# Patient Record
Sex: FEMALE | Race: WHITE | NOT HISPANIC OR LATINO | URBAN - METROPOLITAN AREA
[De-identification: names, ages, dates, MRNs, and addresses within clinical notes are randomized per-mention and may not be internally consistent; named-entity substitution may affect disease eponyms.]

---

## 2020-01-20 ENCOUNTER — HOSPITAL ENCOUNTER (EMERGENCY)
Facility: HOSPITAL | Age: 35
Discharge: LEFT BEFORE DISCHARGE | End: 2020-01-20
Attending: SURGERY

## 2020-01-20 DIAGNOSIS — Z53.29 LEFT AGAINST MEDICAL ADVICE: Primary | ICD-10-CM

## 2020-01-20 DIAGNOSIS — F15.10 METHAMPHETAMINE ABUSE: ICD-10-CM

## 2020-01-20 PROCEDURE — 99281 EMR DPT VST MAYX REQ PHY/QHP: CPT

## 2020-01-20 NOTE — ED NOTES
PATIENT IN TRIAGE DECIDING AGAINST TREATMENT   DR ROBERSON IN TRIAGE SPEAKING WITH HER   UNABLE TO CONVINCE HER TO STAY   DENIES S/I OF H/I

## 2020-01-20 NOTE — ED PROVIDER NOTES
[facility]                                                 Chief Complaint  34 y.o. female with Addiction Problem (HEROIN)    History of Present Illness  Dior Arthur presents to the emergency room with polysubstance abuse  Patient reports heroin and meth and phentermine addiction for several years   Patient is requesting drug rehab, patient states she does not want psych eval  Patient states that she is not psychotic, she is not suicidal or homicidal today  Patient states that she wants to be placed in a drug rehab from the ER today  Patient is not interested in psychiatry consult, refuses Psychiatry MD nielson    The history is provided by the patient   device was not used during this ER visit  Medical history: Opiate dependence  Surgeries: Right index finger amputation and breast surgery  No Known Allergies     I have reviewed all of this patient's past medical, surgical, family, and social   histories as well as active allergies and medications documented in the  electronic medical record    Review of Systems and Physical Exam      Review of Systems  -- Constitution - no fever, denies fatigue, no weakness, no chills  -- Eyes - no tearing or redness, no visual disturbance  -- Ear, Nose - no tinnitus or earache, no nasal congestion or discharge  -- Mouth,Throat - no sore throat, no toothache, normal voice, normal swallowing  -- Respiratory - denies cough and congestion, no shortness of breath, no CEDILLO  -- Cardiovascular - denies chest pain, no palpitations, denies claudication  -- Gastrointestinal - denies abdominal pain, nausea, vomiting, or diarrhea  -- Genitourinary - no dysuria, denies flank pain, no hematuria, no STD risk  -- Musculoskeletal - denies back pain, negative for trauma or injury  -- Neurological - no headache, denies weakness or seizure; no LOC  -- Skin - denies pallor, rash, or changes in skin. no hives or welts noted  -- Psychiatric - polysubstance abuse  with no suicidal ideation    Physical Exam  -- Nursing note and vitals reviewed  -- Constitutional: Appears well-developed and well-nourished  -- Head: Atraumatic. Normocephalic. No obvious abnormality  -- Eyes: Pupils are equal and reactive to light. Normal conjunctiva and lids  -- Cardiac: Normal rate, regular rhythm and normal heart sounds  -- Pulmonary: Normal respiratory effort, breath sounds clear to auscultation  -- Abdominal: Soft, no tenderness. Normal bowel sounds. Normal liver edge  -- Musculoskeletal: Normal range of motion, no effusions. Joints stable   -- Neurological: No focal deficits. Showed good interaction with staff  -- Vascular: Posterior tibial, dorsalis pedis and radial pulses 2+ bilaterally      Emergency Room Course      ED Physician Management  -- Diagnosis management comments: 34 y.o. female with drug addiction issues  -- patient wants drug rehab, she is offered psychiatric evaluation today  -- patient is in no signs of withdrawal, alert appropriate today  -- patient denies any suicidal homicidal ideation, not psychotic on interview  -- patient does not want to stay for a [facility] psychiatric evaluation today  -- patient declines any help from the ER, will go outpatient drug rehab  -- this was against our advice, the patient walked out of the ER today    Diagnosis  -- The primary encounter diagnosis was Left against medical advice.   -- A diagnosis of Methamphetamine abuse was also pertinent to this visit.    Disposition and Plan  -- Disposition: home  -- Condition: stable  -- Patient is of sound mind and capable of making rational decisions  -- Patient is fully aware of the diagnosis and the consequences of leaving AMA  -- Pt was told inpatient treatment needed but wants to leave against advice  -- Patient signed the AMA papers; all questions answered. Voiced understanding    This note is dictated on M*Modal word recognition program.  There are word recognition mistakes that are occasionally  missed on review.         Stu Jasso MD  01/20/20 1600

## 2022-06-13 ENCOUNTER — PATIENT MESSAGE (OUTPATIENT)
Dept: OBSTETRICS AND GYNECOLOGY | Facility: CLINIC | Age: 37
End: 2022-06-13
Payer: MEDICAID

## 2024-10-10 ENCOUNTER — OFFICE VISIT (OUTPATIENT)
Dept: PSYCHIATRY | Facility: CLINIC | Age: 39
End: 2024-10-10
Payer: COMMERCIAL

## 2024-10-10 VITALS
HEIGHT: 63 IN | HEART RATE: 104 BPM | DIASTOLIC BLOOD PRESSURE: 70 MMHG | BODY MASS INDEX: 29.95 KG/M2 | SYSTOLIC BLOOD PRESSURE: 122 MMHG | WEIGHT: 169.06 LBS

## 2024-10-10 DIAGNOSIS — F41.1 GENERALIZED ANXIETY DISORDER: Primary | ICD-10-CM

## 2024-10-10 DIAGNOSIS — F33.0 MILD EPISODE OF RECURRENT MAJOR DEPRESSIVE DISORDER: ICD-10-CM

## 2024-10-10 PROCEDURE — 1159F MED LIST DOCD IN RCRD: CPT | Mod: CPTII,S$GLB,, | Performed by: FAMILY MEDICINE

## 2024-10-10 PROCEDURE — 1160F RVW MEDS BY RX/DR IN RCRD: CPT | Mod: CPTII,S$GLB,, | Performed by: FAMILY MEDICINE

## 2024-10-10 PROCEDURE — 3008F BODY MASS INDEX DOCD: CPT | Mod: CPTII,S$GLB,, | Performed by: FAMILY MEDICINE

## 2024-10-10 PROCEDURE — 3078F DIAST BP <80 MM HG: CPT | Mod: CPTII,S$GLB,, | Performed by: FAMILY MEDICINE

## 2024-10-10 PROCEDURE — 99999 PR PBB SHADOW E&M-EST. PATIENT-LVL II: CPT | Mod: PBBFAC,,, | Performed by: FAMILY MEDICINE

## 2024-10-10 PROCEDURE — 99205 OFFICE O/P NEW HI 60 MIN: CPT | Mod: S$GLB,,, | Performed by: FAMILY MEDICINE

## 2024-10-10 PROCEDURE — 3074F SYST BP LT 130 MM HG: CPT | Mod: CPTII,S$GLB,, | Performed by: FAMILY MEDICINE

## 2024-10-10 RX ORDER — SERTRALINE HYDROCHLORIDE 25 MG/1
25 TABLET, FILM COATED ORAL DAILY
Qty: 30 TABLET | Refills: 11 | Status: SHIPPED | OUTPATIENT
Start: 2024-10-10 | End: 2025-10-10

## 2024-10-10 NOTE — PROGRESS NOTES
Outpatient Psychiatry Initial Visit ([facility])    10/10/2024    Dior Arthur, a 39 y.o. female, presenting for initial evaluation visit. Met with patient.    Reason for Encounter: self-referral. Patient complains of:     History of Present Illness:   States she has a hx of addiction but has been sober from heroin for 4 years. States she has been fine. States she is stressed out all the time. States she has a daughter that is 20 months old and at one point she thought she had post partum depression. States she has anxiety and depression in waves. States she is dealing a lot with her daughters father as well trying to co parent. States she is glad she had a child now because if she still had her addition she would not make it. States was using heroin which caused her to be in and out of long-term and rehabs. States she lost her mom while she was in long-term and COVID started. States she spend 20 years in and out of long-term and rehab. States she was mentally done with using at that point. States she always wanted a normal life and she has that with her daughter. Patient states she works and comes home to her daughter and loves spending time with her. Denies SI, HI, AH, VH.     Mood: good   Anxiety: 3/10  Depression: 3/10    Support system: her family and friends.   Hobbies: play with her daughter.     Standardized Screenings tools:   PHQ9: Mild Depression- 5  NNAMDI- 7: Mild Anxiety -7        Stressors:  Daughter's father co-parenting     History:     Past Psychiatric History:   Previous therapy:  when she was younger   Previous psychiatric treatment and medication trials: yes - Lexapro Effexor states in the past never took anything constantly   Previous psychiatric hospitalizations: yes - 2020.   Previous diagnoses: no  Previous suicide attempts: no  History of violence: no  Currently in treatment with .  Suicidal Ideation: Denies   Auditory Hallucination:Denies   Visual Hallucination:Denies   Education: some college    Social  History:  Housing: House   Lives with: daughter   Marital status: single   Children: daughter 20 months   Education: some college   Special Ed: Denies   Legal:Denies   Employment: full time [employer]   Access to gun: denies   Hx of abuse: yes     Substance Abuse History:  Recreational drugs: denies currently. Sober from Heroin 4 years   Alcohol: Denies   Tobacco use: vape   Rehab: States went to a lot of rehabs. States last stay was 2019 for Heroin     Family Hx: sister bipolar     Neuro Hx  Seizure:Denies   Head trauma/TBI:Denies       Review Of Systems:     Medical Review Of Systems:  Pertinent positives noted in HPI    Psychiatric Review Of Systems:  Sleep: yes  Appetite changes: no  Weight changes: no  Energy: yes  Anhedonia no  Somatic symptoms: no  Anxiety/panic: yes states 2 weeks ago had her 1st panic attack   Guilty/hopeless: yes some guilt   Self-injurious behavior/risky behavior: no  Any drugs: no  Alcohol: no       Current Evaluation:       Mental Status Evaluation:  Appearance:  unremarkable, age appropriate, casually dressed, well dressed, neatly groomed   Behavior:  normal, cooperative   Speech:  no latency; no press   Mood:  steady   Affect:  congruent and appropriate   Thought Process:  normal and logical   Thought Content:  normal, no suicidality, no homicidality, delusions, or paranoia   Sensorium:  grossly intact, person, place, situation, time/date, day of week, month of year, year   Cognition:  grossly intact   Insight:  intact   Judgment:  behavior is adequate to circumstances, age appropriate     Physical/Somatic Complaints   The patient lists: no physical complaints.    Constitutional  unremarkable, age appropriate       Laboratory Data  No visits with results within 1 Month(s) from this visit.   Latest known visit with results is:   Historical on 02/22/2007   Component Date Value Ref Range Status    BUN 02/22/2007 14  5 - 23 mg/dl Final    Sodium 02/22/2007 142  136 -  145 mMol/l Final    Potassium 02/22/2007 4.1  3.3 - 5.3 mMol/l Final    Chloride 02/22/2007 106  95 - 110 mMol/l Final    CO2 02/22/2007 26  23.0 - 29.0 mEq/L Final    Glucose 02/22/2007 82  70 - 110 mg/dl Final    Creatinine 02/22/2007 0.7  0.5 - 1.4 mg/dl Final    Calcium 02/22/2007 9.2  8.7 - 10.5 mg/dl Final    Salicylate Lvl 02/22/2007 <0.5 (L)  3.0 - 29.0 mg/dl Final    Alcohol, Serum 02/22/2007 <10  <10.0 mg/dl Final    Acetaminophen (Tylenol), Serum 02/22/2007 <1.0 (LL)  10.0 - 25.0 ug/ml Final    TCA Screen 02/22/2007 <20.0  ng/mL Final    WBC 02/22/2007 5.47  4.8 - 10.8 K/uL Final    RBC 02/22/2007 4.54  4.00 - 5.40 M/uL Final    Hemoglobin 02/22/2007 11.4 (L)  12.0 - 16.0 gm/dl Final    Hematocrit 02/22/2007 35.3 (L)  37.0 - 48.5 % Final    MCV 02/22/2007 77.8 (L)  82 - 95 fL Final    MCH 02/22/2007 25.1 (L)  27 - 31 pg Final    MCHC 02/22/2007 32.3  32 - 36 % Final    RDW 02/22/2007 13.8  11.5 - 14.5 % Final    Gran % 02/22/2007 56.9  40 - 76 % Final    Lymph % 02/22/2007 34.6  25 - 40 % Final    Mono % 02/22/2007 6.8  0 - 10 % Final    Eosinophil % 02/22/2007 1.5  0.0 - 8.0 % Final    Basophil % 02/22/2007 0.2  0 - 2 % Final    Gran # (ANC) 02/22/2007 3.1  1.4 - 8.7 K/uL Final    Lymph # 02/22/2007 1.9  1.2 - 3.5 K/uL Final    Mono # 02/22/2007 0.4  0.1 - 0.8 K/uL Final    Eos # 02/22/2007 0.1  0.0 - 0.4 K/uL Final    Baso # 02/22/2007 0.0  0.0 - 0.1 K/uL Final    Platelets 02/22/2007 190  150 - 350 K/uL Final    MPV 02/22/2007 10.1  9.2 - 12.9 fL Final    TSH 02/22/2007 0.15 (L)  0.4 - 4.0 uIU/ml Final    Free T4 02/22/2007 1.00  0.71 - 1.51 ng/dl Final    Chlamydia, Amplified DNA 02/22/2007 Not Detected  Not detected Final    N gonorrhoeae, amplified DNA 02/22/2007 Not Detected  Not detected Final    Specimen Source 02/22/2007 CERVIX   Final    Trichomonas 02/22/2007 Moderate (A)  None Seen Final    Clue Cells 02/22/2007 Occ (A)  None Seen Final    Budding Yeast 02/22/2007 None Seen  None Seen Final     Fungal Hyphae 02/22/2007 None Seen  None Seen Final    WBC - Vaginal Screen 02/22/2007 Moderate (A)  <=OCC Final    Bacteria - Vaginal Screen 02/22/2007 Few (A)  None Seen Final    Benzodiazepines 02/22/2007 Positive (A)  Negative Final    Methadone metabolites 02/22/2007 Negative  Negative Final    Cocaine (Metab.) 02/22/2007 Negative  Negative Final    Opiate Scrn, Ur 02/22/2007 Positive (A)  Negative Final    Barbiturate Screen, Ur 02/22/2007 Negative  Negative Final    Amphetamine Screen, Ur 02/22/2007 Negative  Negative Final    THC 02/22/2007 Negative  Negative Final    Phencyclidine 02/22/2007 Negative  Negative Final    Propoxyphene 02/22/2007 Negative  Negative Final    Color, UA 02/22/2007 YELLOW  Yellow Final    Appearance, UA 02/22/2007 SLHAZY (A)  Clear Final    pH, UA 02/22/2007 6.0  4.5 - 8.0 Final    Specific Gravity, UA 02/22/2007 1.018  1.003 - 1.030 Final    Protein, UA 02/22/2007 NEG  Negative mg/dl Final    Glucose, UA 02/22/2007 NEG  Negative mg/dl Final    Ketones, UA 02/22/2007 NEG  Negative mg/dl Final    Bilirubin (UA) 02/22/2007 NEG  Negative Final    Occult Blood UA 02/22/2007 NEG  Negative Final    Nitrite, UA 02/22/2007 POS (A)  Negative Final    Urobilinogen, UA 02/22/2007 0.2  0 - 1 EU/DL Final    Leukocytes, UA 02/22/2007 NEG  Negative Final    Bacteria 02/22/2007 MANY (A)  None-Occ Final    Squam Epithel, UA 02/22/2007 <1  /hpf Final         Medications  Outpatient Encounter Medications as of 10/10/2024   Medication Sig Dispense Refill    alprazolam (XANAX) 0.5 MG tablet Take 1 tablet (0.5 mg total) by mouth 2 (two) times daily as needed for Anxiety. 30 tablet 1    escitalopram (LEXAPRO) 10 MG tablet Take 1 tablet (10 mg total) by mouth once daily. 30 tablet 2     No facility-administered encounter medications on file as of 10/10/2024.           Assessment - Diagnosis - Goals:     Impression: Dior Arthur, a 39 y.o. female, presenting for initial evaluation visit.Medication  management for depression and anxiety     Diagnosis: Generalized anxiety disorder, Mild episode of recurrent major depressive disorder.     Strengths and Liabilities: Strength: Patient accepts guidance/feedback, Strength: Patient is expressive/articulate., Strength: Patient is intelligent., Strength: Patient is motivated for change., Strength: Patient has positive support network.    Treatment Goals:    Anxiety: acquiring relapse prevention skills, reducing negative automatic thoughts, reducing physical symptoms of anxiety, and reducing time spent worrying (<30 minutes/day)  Depression: acquiring relapse prevention skills, increasing energy, increasing interest in usual activities, increasing motivation, and reducing excessive guilt    Treatment Plan/Recommendations:   Medication Management: Continue current medications. The risks and benefits of medication were discussed with the patient.    Start Zoloft 25 mg daily. Medication takes 3-4 weeks to start working. Take medication every day  Discussed diagnosis, risks and benefits of proposed treatment above vs alternative treatments vs no treatment, and potential side effects of these treatments, and the inherent unpredicatbility of individual response to treatment.The patient expresses understanding and gives informed consent to pursue treatment at this time believing that the potential benefits outweight the potential risks. Patient has no other questions. Risks/adverse effects discussed at this time including but not limited to: GI side effects, sexual dysfunction, activation vs sedation, triggering of suicidal thoughts, and serotonin syndrome.  I discussed with the patient the risks of Extrapyramidal Side Effects (dystonia, akathisia, parkinsonism), Metabolic syndrome (inlcuding Hyperglycemia, hyperlipidemia), Neuroleptic Malignant syndrome (fever, muscle rigidity, autonomic instability), Orthostatic hypotension, Tardive Dyskinesia with antipsychotic use.  Patient  voices understanding and agreement with this plan  Provided crisis numbers  Encouraged patient to keep future appointments.  Instructed patient to call or message with questions  In the event of an emergency, including suicidal ideation, patient was advised to go to the emergency room      Return to Clinic: 2 months    Total time: 50 minutes with more than 50% of time spent counseling and/or coordinating care.  (which included pts differential diagnosis and prognosis for psychiatric conditions, risks, benefits of treatments, instructions and adherence to treatment plan, risk reduction, reviewing current psychiatric medication regimen, medical problems and social stressors. In addtion to possible discussion with other healthcare provider/s)    Jasmyne Quiroz  PMHNP

## 2024-12-02 ENCOUNTER — OFFICE VISIT (OUTPATIENT)
Dept: PSYCHIATRY | Facility: CLINIC | Age: 39
End: 2024-12-02
Payer: COMMERCIAL

## 2024-12-02 DIAGNOSIS — F41.1 GENERALIZED ANXIETY DISORDER: Primary | ICD-10-CM

## 2024-12-02 DIAGNOSIS — F33.0 MILD EPISODE OF RECURRENT MAJOR DEPRESSIVE DISORDER: ICD-10-CM

## 2024-12-02 DIAGNOSIS — G47.00 INSOMNIA: ICD-10-CM

## 2024-12-02 PROCEDURE — 99999 PR PBB SHADOW E&M-EST. PATIENT-LVL I: CPT | Mod: PBBFAC,,, | Performed by: FAMILY MEDICINE

## 2024-12-02 RX ORDER — SERTRALINE HYDROCHLORIDE 50 MG/1
50 TABLET, FILM COATED ORAL DAILY
Qty: 30 TABLET | Refills: 11 | Status: SHIPPED | OUTPATIENT
Start: 2024-12-02 | End: 2025-12-02

## 2024-12-02 NOTE — PROGRESS NOTES
"Outpatient Psychiatry Follow-Up Visit (MD/ANA)    12/2/2024    Clinical Status of Patient:  Outpatient (Ambulatory)    Chief Complaint:  Dior Arthur is a 39 y.o. female who presents today for follow-up of depression and anxiety.  Met with patient.      Interval History and Content of Current Session 12/02/2024:    Pt reports today: "I am feeling better less anxious "    Mood overall is "I am good"    Rates depression as 2/10 and anxiety as 4/10 over the past 2 weeks.    Patients mood is calm, affect appears relaxed. Linear and logical, friendly and cooperative, good eye contact.    Denies SI/HI/AVH. Pt reports sleeping well and normal appetite. Denies side effects of medications.    Pt reports taking medications as prescribed and behaving appropriately during interview today.  States her anxiety has gone down a lot. States she would obsesses about things and states now she is doing better and not letting things get to her as much.   States she is loves spending time with her daughter. States currently getting back to her routine after the Thanksgiving holidays. States in the past she would let her daughter's father irritated her but recently she had to tell him she needs peace for her mind and to help raise her daughter. States she is telling people she can't make their issues her issues.   States her daughter keeps her going and not wanting to give up on life.     Psychotherapy:  Target symptoms: depression, anxiety   Why chosen therapy is appropriate versus another modality: relevant to diagnosis  Outcome monitoring methods: self-report, observation  Therapeutic intervention type: insight oriented psychotherapy  Topics discussed/themes: parenting issues, financial stressors  The patient's response to the intervention is accepting. The patient's progress toward treatment goals is good.   Duration of intervention: 17 minutes.      Prior visit States she has a hx of addiction but has been sober from heroin for 4 " years. States she has been fine. States she is stressed out all the time. States she has a daughter that is 20 months old and at one point she thought she had post partum depression. States she has anxiety and depression in waves. States she is dealing a lot with her daughters father as well trying to co parent. States she is glad she had a child now because if she still had her addition she would not make it. States was using heroin which caused her to be in and out of prison and rehabs. States she lost her mom while she was in prison and COVID started. States she spend 20 years in and out of prison and rehab. States she was mentally done with using at that point. States she always wanted a normal life and she has that with her daughter. Patient states she works and comes home to her daughter and loves spending time with her. Zehra SI, HI, AH, VH. :            Review of Systems     ROS    Psychiatric Review Of Systems - Is patient experiencing or having changes in:  sleep: yes  appetite: no  weight: no  energy/anergy: yes  interest/pleasure/anhedonia: yes  somatic symptoms: no  libido: no  anxiety/panic: no  guilty/hopelessness: no  concentration: no  S.I.B.s/risky behavior: no  Irritability: no  Racing thoughts: no  Impulsive behaviors: no  Paranoia: no  AVH: no      Past Medical, Family and Social History: The patient's past medical, family and social history have been reviewed and updated as appropriate within the electronic medical record - see encounter notes.      Current Medications:   Medication List with Changes/Refills   Current Medications    ALPRAZOLAM (XANAX) 0.5 MG TABLET    Take 1 tablet (0.5 mg total) by mouth 2 (two) times daily as needed for Anxiety.    SERTRALINE (ZOLOFT) 25 MG TABLET    Take 1 tablet (25 mg total) by mouth once daily.         Allergies:   Review of patient's allergies indicates:  No Known Allergies      Vitals   There were no vitals filed for this visit.       Labs/Imaging/Studies:  "  No results found for this or any previous visit (from the past 48 hours).   No results found for: "PHENYTOIN", "PHENOBARB", "VALPROATE", "CBMZ"    Compliance: yes    Side effects: None    Risk Parameters:  Patient reports no suicidal ideation  Patient reports no homicidal ideation  Patient reports no self-injurious behavior  Patient reports no violent behavior    Exam (detailed: at least 9 elements; comprehensive: all 15 elements)   Constitutional  Vitals:  Most recent vital signs, dated less than 90 days prior to this appointment, were reviewed.   There were no vitals filed for this visit.     General:  unremarkable, age appropriate, casually dressed, well dressed     Musculoskeletal  Muscle Strength/Tone:  not examined   Gait & Station:  non-ataxic     Psychiatric  Speech:  no latency; no press   Mood & Affect:  steady  congruent and appropriate, appropriate   Thought Process:  normal and logical   Associations:  intact   Thought Content:  normal, no suicidality, no homicidality, delusions, or paranoia   Insight:  limited awareness of illness   Judgement: behavior is adequate to circumstances, age appropriate   Orientation:  grossly intact, person, place, situation, time/date, day of week, month of year, year   Memory: intact for content of interview, grossly intact   Language: grossly intact   Attention Span & Concentration:  able to focus   Fund of Knowledge:  intact and appropriate to age and level of education     Assessment and Diagnosis   Status/Progress: Based on the examination today, the patient's problem(s) is/are improved.  New problems have not been presented today.   Co-morbidities are not complicating management of the primary condition.  There are no active rule-out diagnoses for this patient at this time.     General Impression:    Generalized anxiety disorder, Mild episode of recurrent major depressive disorder.     Intervention/Counseling/Treatment Plan   Medication Management: Continue current " medications. The risks and benefits of medication were discussed with the patient.    Increase Zoloft to 50 mg daily   -      The risks and benefits of medication were discussed with the patient.  Discussed diagnosis, risks and benefits of proposed treatment above vs alternative treatments vs no treatment, and potential side effects of these treatments. The patient expresses understanding of the above and displays the capacity to agree with this treatment given said understanding. Patient also agrees that, currently, the benefits outweigh the risks and would like to pursue treatment at this time.  Discussed inherent unpredictability of medications in each individual.   Encouraged Patient to keep future appointments.   Take medications as prescribed and abstain from substance abuse.   In the event of an emergency patient was advised to go to the emergency room      Return to Clinic:  4 months         BUDDY Rivera      Total face to face time: 23 min        *This note has been prepared using a combination of a dictation device and typing.  It has been checked for errors but some errors may still exist within the note as a result of speech recognition errors and/or typographical errors.

## 2025-02-03 ENCOUNTER — OFFICE VISIT (OUTPATIENT)
Dept: INTERNAL MEDICINE | Facility: CLINIC | Age: 40
End: 2025-02-03
Payer: COMMERCIAL

## 2025-02-03 VITALS
HEART RATE: 70 BPM | HEIGHT: 63 IN | SYSTOLIC BLOOD PRESSURE: 110 MMHG | BODY MASS INDEX: 31.45 KG/M2 | DIASTOLIC BLOOD PRESSURE: 72 MMHG | OXYGEN SATURATION: 97 % | WEIGHT: 177.5 LBS

## 2025-02-03 DIAGNOSIS — R00.0 TACHYCARDIA: Primary | ICD-10-CM

## 2025-02-03 DIAGNOSIS — Z00.00 LABORATORY EXAM ORDERED AS PART OF ROUTINE GENERAL MEDICAL EXAMINATION: ICD-10-CM

## 2025-02-03 PROCEDURE — 1160F RVW MEDS BY RX/DR IN RCRD: CPT | Mod: CPTII,S$GLB,, | Performed by: STUDENT IN AN ORGANIZED HEALTH CARE EDUCATION/TRAINING PROGRAM

## 2025-02-03 PROCEDURE — 93010 ELECTROCARDIOGRAM REPORT: CPT | Mod: S$GLB,,, | Performed by: INTERNAL MEDICINE

## 2025-02-03 PROCEDURE — 3078F DIAST BP <80 MM HG: CPT | Mod: CPTII,S$GLB,, | Performed by: STUDENT IN AN ORGANIZED HEALTH CARE EDUCATION/TRAINING PROGRAM

## 2025-02-03 PROCEDURE — 99213 OFFICE O/P EST LOW 20 MIN: CPT | Mod: S$GLB,,, | Performed by: STUDENT IN AN ORGANIZED HEALTH CARE EDUCATION/TRAINING PROGRAM

## 2025-02-03 PROCEDURE — 3074F SYST BP LT 130 MM HG: CPT | Mod: CPTII,S$GLB,, | Performed by: STUDENT IN AN ORGANIZED HEALTH CARE EDUCATION/TRAINING PROGRAM

## 2025-02-03 PROCEDURE — 93005 ELECTROCARDIOGRAM TRACING: CPT | Mod: S$GLB,,, | Performed by: STUDENT IN AN ORGANIZED HEALTH CARE EDUCATION/TRAINING PROGRAM

## 2025-02-03 PROCEDURE — 3008F BODY MASS INDEX DOCD: CPT | Mod: CPTII,S$GLB,, | Performed by: STUDENT IN AN ORGANIZED HEALTH CARE EDUCATION/TRAINING PROGRAM

## 2025-02-03 PROCEDURE — 1159F MED LIST DOCD IN RCRD: CPT | Mod: CPTII,S$GLB,, | Performed by: STUDENT IN AN ORGANIZED HEALTH CARE EDUCATION/TRAINING PROGRAM

## 2025-02-03 PROCEDURE — 99999 PR PBB SHADOW E&M-EST. PATIENT-LVL IV: CPT | Mod: PBBFAC,,, | Performed by: STUDENT IN AN ORGANIZED HEALTH CARE EDUCATION/TRAINING PROGRAM

## 2025-02-03 RX ORDER — MEDROXYPROGESTERONE ACETATE 150 MG/ML
INJECTION, SUSPENSION INTRAMUSCULAR
COMMUNITY

## 2025-02-03 NOTE — PROGRESS NOTES
SUBJECTIVE     Chief Complaint   Patient presents with    Tachycardia       HPI  Dior Arthur is a 39 y.o. female with  NNAMDI, Depression  that presents for evaluation of tachycardia.     She experiences palpitations 5-10 times daily, with heart rate recorded at 139 on watch last week. She describes feeling heartbeat in throat. Episodes occur while working without specific triggers. Associated symptoms include shortness of breath. She denies chest pain or syncope.    EKG in clinic today showing NSR with no ischemic wave changes. Ventricular rate 84bpm.     FAMILY HISTORY:  Maternal grandmother had cardiac issues. Niece underwent cardiac surgery.    MEDICATIONS:  She takes low dose Zoloft and a daily multivitamin.    SOCIAL HISTORY:  She has history of IV drug use but has maintained sobriety for nearly 5 years, with anniversary approaching on the seventh. She limits coffee consumption to mornings only.         PAST MEDICAL HISTORY:  Past Medical History:   Diagnosis Date    Opioid dependence        PAST SURGICAL HISTORY:  Past Surgical History:   Procedure Laterality Date    AMPUTATION  2020    RIGHT TIP OF INDEX FINGER    BREAST SURGERY         FAMILY HISTORY:  No family history on file.    ALLERGIES AND MEDICATIONS: updated and reviewed.  Review of patient's allergies indicates:  No Known Allergies  Current Outpatient Medications   Medication Sig Dispense Refill    medroxyPROGESTERone (DEPO-PROVERA) 150 mg/mL Syrg SMARTSI Milliliter(s) IM Every 12 Weeks      sertraline (ZOLOFT) 50 MG tablet Take 1 tablet (50 mg total) by mouth once daily. 30 tablet 11    alprazolam (XANAX) 0.5 MG tablet Take 1 tablet (0.5 mg total) by mouth 2 (two) times daily as needed for Anxiety. 30 tablet 1     No current facility-administered medications for this visit.       ROS  Review of Systems   Constitutional:  Negative for activity change, chills and fever.   HENT:  Negative for congestion and hearing loss.    Eyes:  Negative for  "pain and visual disturbance.   Respiratory:  Negative for cough and shortness of breath.    Cardiovascular:  Positive for palpitations. Negative for chest pain.   Gastrointestinal:  Negative for abdominal pain, constipation, diarrhea, nausea and vomiting.   Endocrine: Negative.    Genitourinary: Negative.    Musculoskeletal:  Negative for arthralgias and myalgias.   Skin: Negative.    Allergic/Immunologic: Negative.    Neurological:  Negative for dizziness, light-headedness and headaches.   Hematological: Negative.          OBJECTIVE     Physical Exam  Vitals:    02/03/25 1512   BP: 110/72   Pulse: 70    Body mass index is 31.44 kg/m².  Weight: 80.5 kg (177 lb 7.5 oz)   Height: 5' 3" (160 cm)     Physical Exam  Vitals reviewed.   Constitutional:       General: She is not in acute distress.     Appearance: Normal appearance.   HENT:      Head: Normocephalic and atraumatic.      Mouth/Throat:      Mouth: Mucous membranes are moist.      Pharynx: Oropharynx is clear.   Eyes:      Extraocular Movements: Extraocular movements intact.      Conjunctiva/sclera: Conjunctivae normal.      Pupils: Pupils are equal, round, and reactive to light.   Cardiovascular:      Rate and Rhythm: Normal rate and regular rhythm.      Pulses: Normal pulses.      Heart sounds: Normal heart sounds.   Pulmonary:      Effort: Pulmonary effort is normal.      Breath sounds: Normal breath sounds.   Abdominal:      General: There is no distension.   Musculoskeletal:         General: Normal range of motion.      Cervical back: Normal range of motion and neck supple.   Skin:     General: Skin is warm and dry.   Neurological:      General: No focal deficit present.      Mental Status: She is alert.           ASSESSMENT     39 y.o. female with     1. Tachycardia    2. Laboratory exam ordered as part of routine general medical examination        PLAN:     1. Tachycardia  - Normal rate in clinic today, EKG with no signs of arrhythmias. Work up as below. " Counseled on return precautions.   - CBC W/ AUTO DIFFERENTIAL; Future  - COMPREHENSIVE METABOLIC PANEL; Future  - TSH; Future  - Magnesium; Future  - IN OFFICE EKG 12-LEAD (to Muse)  - Holter monitor - 48 hour; Future  - Echo; Future    2. Laboratory exam ordered as part of routine general medical examination  - HEMOGLOBIN A1C; Future  - LIPID PANEL; Future      RTC PRN       Ugo Reyes MD  Family Medicine  [facility]  02/03/2025    This document was created using voice recognition software (M*Modal Fluency Direct). Although it may be edited, this document may contain errors related to incorrect recognition of the spoken word. Please call the physician if clarification is needed.       No follow-ups on file.                   Routine  care and anticipatory guidance

## 2025-02-04 LAB
OHS QRS DURATION: 72 MS
OHS QTC CALCULATION: 441 MS

## 2025-02-05 ENCOUNTER — LAB VISIT (OUTPATIENT)
Dept: LAB | Facility: HOSPITAL | Age: 40
End: 2025-02-05
Attending: STUDENT IN AN ORGANIZED HEALTH CARE EDUCATION/TRAINING PROGRAM
Payer: COMMERCIAL

## 2025-02-05 DIAGNOSIS — R00.0 TACHYCARDIA: ICD-10-CM

## 2025-02-05 DIAGNOSIS — Z00.00 LABORATORY EXAM ORDERED AS PART OF ROUTINE GENERAL MEDICAL EXAMINATION: ICD-10-CM

## 2025-02-05 LAB
ALBUMIN SERPL BCP-MCNC: 4 G/DL (ref 3.5–5.2)
ALP SERPL-CCNC: 63 U/L (ref 40–150)
ALT SERPL W/O P-5'-P-CCNC: 10 U/L (ref 10–44)
ANION GAP SERPL CALC-SCNC: 10 MMOL/L (ref 8–16)
AST SERPL-CCNC: 18 U/L (ref 10–40)
BASOPHILS # BLD AUTO: 0.03 K/UL (ref 0–0.2)
BASOPHILS NFR BLD: 0.5 % (ref 0–1.9)
BILIRUB SERPL-MCNC: 0.5 MG/DL (ref 0.1–1)
BUN SERPL-MCNC: 12 MG/DL (ref 6–20)
CALCIUM SERPL-MCNC: 9.3 MG/DL (ref 8.7–10.5)
CHLORIDE SERPL-SCNC: 107 MMOL/L (ref 95–110)
CHOLEST SERPL-MCNC: 212 MG/DL (ref 120–199)
CHOLEST/HDLC SERPL: 3.8 {RATIO} (ref 2–5)
CO2 SERPL-SCNC: 19 MMOL/L (ref 23–29)
CREAT SERPL-MCNC: 0.8 MG/DL (ref 0.5–1.4)
DIFFERENTIAL METHOD BLD: ABNORMAL
EOSINOPHIL # BLD AUTO: 0.1 K/UL (ref 0–0.5)
EOSINOPHIL NFR BLD: 1.3 % (ref 0–8)
ERYTHROCYTE [DISTWIDTH] IN BLOOD BY AUTOMATED COUNT: 13.2 % (ref 11.5–14.5)
EST. GFR  (NO RACE VARIABLE): >60 ML/MIN/1.73 M^2
ESTIMATED AVG GLUCOSE: 88 MG/DL (ref 68–131)
GLUCOSE SERPL-MCNC: 85 MG/DL (ref 70–110)
HBA1C MFR BLD: 4.7 % (ref 4–5.6)
HCT VFR BLD AUTO: 44.9 % (ref 37–48.5)
HDLC SERPL-MCNC: 56 MG/DL (ref 40–75)
HDLC SERPL: 26.4 % (ref 20–50)
HGB BLD-MCNC: 14 G/DL (ref 12–16)
IMM GRANULOCYTES # BLD AUTO: 0.02 K/UL (ref 0–0.04)
IMM GRANULOCYTES NFR BLD AUTO: 0.3 % (ref 0–0.5)
LDLC SERPL CALC-MCNC: 133.4 MG/DL (ref 63–159)
LYMPHOCYTES # BLD AUTO: 1.1 K/UL (ref 1–4.8)
LYMPHOCYTES NFR BLD: 19 % (ref 18–48)
MAGNESIUM SERPL-MCNC: 2.2 MG/DL (ref 1.6–2.6)
MCH RBC QN AUTO: 26.9 PG (ref 27–31)
MCHC RBC AUTO-ENTMCNC: 31.2 G/DL (ref 32–36)
MCV RBC AUTO: 86 FL (ref 82–98)
MONOCYTES # BLD AUTO: 0.5 K/UL (ref 0.3–1)
MONOCYTES NFR BLD: 7.5 % (ref 4–15)
NEUTROPHILS # BLD AUTO: 4.3 K/UL (ref 1.8–7.7)
NEUTROPHILS NFR BLD: 71.4 % (ref 38–73)
NONHDLC SERPL-MCNC: 156 MG/DL
NRBC BLD-RTO: 0 /100 WBC
PLATELET # BLD AUTO: 198 K/UL (ref 150–450)
PMV BLD AUTO: 11.2 FL (ref 9.2–12.9)
POTASSIUM SERPL-SCNC: 5 MMOL/L (ref 3.5–5.1)
PROT SERPL-MCNC: 7.7 G/DL (ref 6–8.4)
RBC # BLD AUTO: 5.21 M/UL (ref 4–5.4)
SODIUM SERPL-SCNC: 136 MMOL/L (ref 136–145)
TRIGL SERPL-MCNC: 113 MG/DL (ref 30–150)
TSH SERPL DL<=0.005 MIU/L-ACNC: 1.07 UIU/ML (ref 0.4–4)
WBC # BLD AUTO: 5.99 K/UL (ref 3.9–12.7)

## 2025-02-05 PROCEDURE — 83735 ASSAY OF MAGNESIUM: CPT | Performed by: STUDENT IN AN ORGANIZED HEALTH CARE EDUCATION/TRAINING PROGRAM

## 2025-02-05 PROCEDURE — 80061 LIPID PANEL: CPT | Performed by: STUDENT IN AN ORGANIZED HEALTH CARE EDUCATION/TRAINING PROGRAM

## 2025-02-05 PROCEDURE — 83036 HEMOGLOBIN GLYCOSYLATED A1C: CPT | Performed by: STUDENT IN AN ORGANIZED HEALTH CARE EDUCATION/TRAINING PROGRAM

## 2025-02-05 PROCEDURE — 85025 COMPLETE CBC W/AUTO DIFF WBC: CPT | Performed by: STUDENT IN AN ORGANIZED HEALTH CARE EDUCATION/TRAINING PROGRAM

## 2025-02-05 PROCEDURE — 84443 ASSAY THYROID STIM HORMONE: CPT | Performed by: STUDENT IN AN ORGANIZED HEALTH CARE EDUCATION/TRAINING PROGRAM

## 2025-02-05 PROCEDURE — 80053 COMPREHEN METABOLIC PANEL: CPT | Performed by: STUDENT IN AN ORGANIZED HEALTH CARE EDUCATION/TRAINING PROGRAM

## 2025-03-03 ENCOUNTER — HOSPITAL ENCOUNTER (OUTPATIENT)
Dept: CARDIOLOGY | Facility: HOSPITAL | Age: 40
Discharge: HOME | End: 2025-03-03
Attending: STUDENT IN AN ORGANIZED HEALTH CARE EDUCATION/TRAINING PROGRAM
Payer: COMMERCIAL

## 2025-03-03 VITALS
SYSTOLIC BLOOD PRESSURE: 118 MMHG | HEIGHT: 63 IN | BODY MASS INDEX: 31.36 KG/M2 | DIASTOLIC BLOOD PRESSURE: 72 MMHG | WEIGHT: 177 LBS | HEART RATE: 75 BPM

## 2025-03-03 DIAGNOSIS — R00.0 TACHYCARDIA: ICD-10-CM

## 2025-03-03 LAB
ASCENDING AORTA: 2.48 CM
AV AREA BY CONTINUOUS VTI: 2.5 CM2
AV INDEX (PROSTH): 0.79
AV LVOT MEAN GRADIENT: 1 MMHG
AV LVOT PEAK GRADIENT: 3 MMHG
AV MEAN GRADIENT: 2 MMHG
AV PEAK GRADIENT: 4 MMHG
AV VALVE AREA BY VELOCITY RATIO: 2.5 CM²
AV VALVE AREA: 2.5 CM2
AV VELOCITY RATIO: 0.8
BSA FOR ECHO PROCEDURE: 1.89 M2
CV ECHO LV RWT: 0.28 CM
DOP CALC AO PEAK VEL: 1 M/S
DOP CALC AO VTI: 19.2 CM
DOP CALC LVOT AREA: 3.1 CM2
DOP CALC LVOT DIAMETER: 2 CM
DOP CALC LVOT PEAK VEL: 0.8 M/S
DOP CALC LVOT STROKE VOLUME: 47.7 CM3
DOP CALCLVOT PEAK VEL VTI: 15.2 CM
E WAVE DECELERATION TIME: 207 MS
E/A RATIO: 1.57
E/E' RATIO: 4 M/S
ECHO EF ESTIMATED: 71 %
ECHO LV POSTERIOR WALL: 0.7 CM (ref 0.6–1.1)
FRACTIONAL SHORTENING: 40 % (ref 28–44)
INTERVENTRICULAR SEPTUM: 0.7 CM (ref 0.6–1.1)
IVC DIAMETER: 1.72 CM
LA MAJOR: 4.8 CM
LA MINOR: 4.7 CM
LA WIDTH: 3.9 CM
LEFT ATRIUM SIZE: 3.1 CM
LEFT ATRIUM VOLUME INDEX MOD: 28 ML/M2
LEFT ATRIUM VOLUME INDEX: 27 ML/M2
LEFT ATRIUM VOLUME MOD: 51 ML
LEFT ATRIUM VOLUME: 49 CM3
LEFT INTERNAL DIMENSION IN SYSTOLE: 3 CM (ref 2.1–4)
LEFT VENTRICLE DIASTOLIC VOLUME INDEX: 64.13 ML/M2
LEFT VENTRICLE DIASTOLIC VOLUME: 118 ML
LEFT VENTRICLE MASS INDEX: 62.3 G/M2
LEFT VENTRICLE SYSTOLIC VOLUME INDEX: 18.5 ML/M2
LEFT VENTRICLE SYSTOLIC VOLUME: 34 ML
LEFT VENTRICULAR INTERNAL DIMENSION IN DIASTOLE: 5 CM (ref 3.5–6)
LEFT VENTRICULAR MASS: 114.7 G
LV LATERAL E/E' RATIO: 3.4
LV SEPTAL E/E' RATIO: 6.4
MV PEAK A VEL: 0.37 M/S
MV PEAK E VEL: 0.58 M/S
OHS CV RV/LV RATIO: 0.58 CM
OHS LV EJECTION FRACTION SIMPSONS BIPLANE MOD: 59 %
PISA TR MAX VEL: 2 M/S
RA MAJOR: 4.55 CM
RA PRESSURE ESTIMATED: 3 MMHG
RA WIDTH: 3.4 CM
RIGHT ATRIAL AREA: 14.8 CM2
RIGHT VENTRICLE DIASTOLIC BASEL DIMENSION: 2.9 CM
RV TB RVSP: 5 MMHG
RV TISSUE DOPPLER FREE WALL SYSTOLIC VELOCITY 1 (APICAL 4 CHAMBER VIEW): 14.03 CM/S
SINUS: 2.71 CM
STJ: 2.47 CM
TDI LATERAL: 0.17 M/S
TDI SEPTAL: 0.09 M/S
TDI: 0.13 M/S
TRICUSPID ANNULAR PLANE SYSTOLIC EXCURSION: 2.48 CM
TV PEAK GRADIENT: 16 MMHG
TV REST PULMONARY ARTERY PRESSURE: 19 MMHG
Z-SCORE OF LEFT VENTRICULAR DIMENSION IN END DIASTOLE: -0.21
Z-SCORE OF LEFT VENTRICULAR DIMENSION IN END SYSTOLE: -0.39

## 2025-03-03 PROCEDURE — 93306 TTE W/DOPPLER COMPLETE: CPT | Mod: 26,,, | Performed by: INTERNAL MEDICINE

## 2025-03-03 PROCEDURE — 93227 XTRNL ECG REC<48 HR R&I: CPT | Mod: ,,, | Performed by: INTERNAL MEDICINE

## 2025-03-03 PROCEDURE — 93225 XTRNL ECG REC<48 HRS REC: CPT | Mod: PO

## 2025-03-03 PROCEDURE — 93306 TTE W/DOPPLER COMPLETE: CPT

## 2025-03-06 LAB
OHS CV EVENT MONITOR DAY: 0
OHS CV HOLTER LENGTH DECIMAL HOURS: 47.98
OHS CV HOLTER LENGTH HOURS: 47
OHS CV HOLTER LENGTH MINUTES: 59
OHS CV HOLTER SINUS AVERAGE HR: 88
OHS CV HOLTER SINUS MAX HR: 158
OHS CV HOLTER SINUS MIN HR: 54

## 2025-03-13 ENCOUNTER — RESULTS FOLLOW-UP (OUTPATIENT)
Dept: INTERNAL MEDICINE | Facility: CLINIC | Age: 40
End: 2025-03-13

## 2025-04-21 ENCOUNTER — PATIENT MESSAGE (OUTPATIENT)
Dept: PSYCHIATRY | Facility: CLINIC | Age: 40
End: 2025-04-21
Payer: COMMERCIAL

## 2025-04-23 ENCOUNTER — TELEPHONE (OUTPATIENT)
Dept: PSYCHIATRY | Facility: HOSPITAL | Age: 40
End: 2025-04-23
Payer: COMMERCIAL

## 2025-04-23 NOTE — TELEPHONE ENCOUNTER
Spoke with patient who sent a message stating she would like therapy. Last saw patient in December, due for a follow up appointment per last note in 4 months. Spoke with patient about making a follow up appointment, will discuss medication increase and therapy referral.

## 2025-04-24 ENCOUNTER — OFFICE VISIT (OUTPATIENT)
Dept: PSYCHIATRY | Facility: CLINIC | Age: 40
End: 2025-04-24
Payer: COMMERCIAL

## 2025-04-24 DIAGNOSIS — F41.1 GENERALIZED ANXIETY DISORDER: ICD-10-CM

## 2025-04-24 DIAGNOSIS — F33.0 MILD EPISODE OF RECURRENT MAJOR DEPRESSIVE DISORDER: Primary | ICD-10-CM

## 2025-04-24 RX ORDER — SERTRALINE HYDROCHLORIDE 100 MG/1
100 TABLET, FILM COATED ORAL DAILY
Qty: 30 TABLET | Refills: 11 | Status: SHIPPED | OUTPATIENT
Start: 2025-04-24 | End: 2026-04-24

## 2025-04-24 NOTE — PROGRESS NOTES
"Outpatient Psychiatry Follow-Up Visit (MD/ANA)    4/24/2025    Clinical Status of Patient:  Outpatient (Ambulatory)    Chief Complaint:  Dior Arthur is a 39 y.o. female who presents today for follow-up of depression and anxiety.  Met with patient.      Interval History and Content of Current Session 04/24/2025:    Pt reports today: "I am calm"    Mood overall is "good"    Rates depression as 3/10 and anxiety as 3/10 over the past 2 weeks.    Patients mood is calm, affect appears relaxed. Linear and logical, friendly and cooperative, good eye contact.    Denies SI/HI/AVH. Pt reports sleeping well and normal appetite. Denies side effects of medications.    Pt reports taking medications as prescribed and behaving appropriately during interview today. Patient states she has been doing well going to the gym everyday after work. States her mood has been well but would like to increase Zoloft if possible because at times she does get down in her mood but has noticed a positive difference in how she feels since starting the medication. Denies any thoughts of wanting to hurt herself stating she loves her daughter and wants to continue to be in her life.       Psychotherapy:  Target symptoms: depression, anxiety   Why chosen therapy is appropriate versus another modality: relevant to diagnosis  Outcome monitoring methods: self-report, observation  Therapeutic intervention type: supportive psychotherapy  Topics discussed/themes: difficulty managing affect in interpersonal relationships, building skills sets for symptom management  The patient's response to the intervention is accepting. The patient's progress toward treatment goals is good.   Duration of intervention:  27 mins.      Prior visit 12/02/2024:     Pt reports today: "I am feeling better less anxious "     Mood overall is "I am good"     Rates depression as 2/10 and anxiety as 4/10 over the past 2 weeks.     Patients mood is calm, affect appears relaxed. Linear " and logical, friendly and cooperative, good eye contact.     Denies SI/HI/AVH. Pt reports sleeping well and normal appetite. Denies side effects of medications.     Pt reports taking medications as prescribed and behaving appropriately during interview today.  States her anxiety has gone down a lot. States she would obsesses about things and states now she is doing better and not letting things get to her as much.   States she is loves spending time with her daughter. States currently getting back to her routine after the  holidays. States in the past she would let her daughter's father irritated her but recently she had to tell him she needs peace for her mind and to help raise her daughter. States she is telling people she can't make their issues her issues.   States her daughter keeps her going and not wanting to give up on life. :            Review of Systems     ROS    Psychiatric Review Of Systems - Is patient experiencing or having changes in:  sleep: yes  appetite: no  weight: yes,   energy/anergy: yes  interest/pleasure/anhedonia: yes  somatic symptoms: no  libido: no  anxiety/panic: no  guilty/hopelessness: no  concentration: no  S.I.B.s/risky behavior: no  Irritability: yes  Racing thoughts: no  Impulsive behaviors: no  Paranoia: no  AVH: no      Past Medical, Family and Social History: The patient's past medical, family and social history have been reviewed and updated as appropriate within the electronic medical record - see encounter notes.      Current Medications:   Medication List with Changes/Refills   Current Medications    MEDROXYPROGESTERONE (DEPO-PROVERA) 150 MG/ML SYRG    SMARTSI Milliliter(s) IM Every 12 Weeks    SERTRALINE (ZOLOFT) 50 MG TABLET    Take 1 tablet (50 mg total) by mouth once daily.         Allergies:   Review of patient's allergies indicates:  No Known Allergies      Vitals   There were no vitals filed for this visit.       Labs/Imaging/Studies:   No results  "found for this or any previous visit (from the past 48 hours).   No results found for: "PHENYTOIN", "PHENOBARB", "VALPROATE", "CBMZ"    Compliance: yes    Side effects: None    Risk Parameters:  Patient reports no suicidal ideation  Patient reports no homicidal ideation  Patient reports no self-injurious behavior  Patient reports no violent behavior    Exam (detailed: at least 9 elements; comprehensive: all 15 elements)   Constitutional  Vitals:  Most recent vital signs, dated less than 90 days prior to this appointment, were reviewed.   There were no vitals filed for this visit.     General:  unremarkable, age appropriate, casually dressed, well dressed     Musculoskeletal  Muscle Strength/Tone:  not examined   Gait & Station:  non-ataxic     Psychiatric  Speech:  no latency; no press   Mood & Affect:  steady  congruent and appropriate   Thought Process:  normal and logical   Associations:  intact   Thought Content:  normal, no suicidality, no homicidality, delusions, or paranoia   Insight:  limited awareness of illness   Judgement: behavior is adequate to circumstances, age appropriate   Orientation:  grossly intact, person, place, situation, time/date, day of week, month of year, year   Memory: intact for content of interview   Language: grossly intact   Attention Span & Concentration:  able to focus   Fund of Knowledge:  intact and appropriate to age and level of education     Assessment and Diagnosis   Status/Progress: Based on the examination today, the patient's problem(s) is/are well controlled.  New problems have not been presented today.   Co-morbidities are not complicating management of the primary condition.  There are no active rule-out diagnoses for this patient at this time.     General Impression:    Generalized anxiety disorder, Mild episode of recurrent major depressive disorder.     Intervention/Counseling/Treatment Plan   Medication Management: Continue current medications. The risks and " benefits of medication were discussed with the patient.    -Increase Zoloft to 100 mg daily   Referral placed for therapy. Suggested     The risks and benefits of medication were discussed with the patient.  Discussed diagnosis, risks and benefits of proposed treatment above vs alternative treatments vs no treatment, and potential side effects of these treatments. The patient expresses understanding of the above and displays the capacity to agree with this treatment given said understanding. Patient also agrees that, currently, the benefits outweigh the risks and would like to pursue treatment at this time.  Discussed inherent unpredictability of medications in each individual.   Encouraged Patient to keep future appointments.   Take medications as prescribed and abstain from substance abuse.   In the event of an emergency patient was advised to go to the emergency room      Return to Clinic: 6 months    BUDDY Rivera      Total face to face time: 34   min        *This note has been prepared using a combination of a dictation device and typing.  It has been checked for errors but some errors may still exist within the note as a result of speech recognition errors and/or typographical errors.

## 2025-05-29 ENCOUNTER — TELEPHONE (OUTPATIENT)
Dept: PSYCHIATRY | Facility: CLINIC | Age: 40
End: 2025-05-29
Payer: COMMERCIAL

## 2025-05-29 NOTE — TELEPHONE ENCOUNTER
Called and left  for patient regarding 11 am appointment, instructed her to refer to instructions on the Intake Coordination MyChart message in order to reschedule if she is unable to make it today and interested in rescheduling.

## 2025-06-23 ENCOUNTER — OFFICE VISIT (OUTPATIENT)
Dept: INTERNAL MEDICINE | Facility: CLINIC | Age: 40
End: 2025-06-23
Payer: COMMERCIAL

## 2025-06-23 VITALS
BODY MASS INDEX: 32.19 KG/M2 | OXYGEN SATURATION: 100 % | SYSTOLIC BLOOD PRESSURE: 120 MMHG | WEIGHT: 181.69 LBS | DIASTOLIC BLOOD PRESSURE: 62 MMHG | HEIGHT: 63 IN | HEART RATE: 70 BPM

## 2025-06-23 DIAGNOSIS — G43.009 MIGRAINE WITHOUT AURA AND WITHOUT STATUS MIGRAINOSUS, NOT INTRACTABLE: ICD-10-CM

## 2025-06-23 DIAGNOSIS — F11.21 OPIOID USE DISORDER, MODERATE, IN SUSTAINED REMISSION: ICD-10-CM

## 2025-06-23 DIAGNOSIS — G54.8 PHANTOM PAIN: Primary | ICD-10-CM

## 2025-06-23 DIAGNOSIS — R52 PHANTOM PAIN: Primary | ICD-10-CM

## 2025-06-23 PROCEDURE — 3074F SYST BP LT 130 MM HG: CPT | Mod: CPTII,S$GLB,, | Performed by: STUDENT IN AN ORGANIZED HEALTH CARE EDUCATION/TRAINING PROGRAM

## 2025-06-23 PROCEDURE — 1159F MED LIST DOCD IN RCRD: CPT | Mod: CPTII,S$GLB,, | Performed by: STUDENT IN AN ORGANIZED HEALTH CARE EDUCATION/TRAINING PROGRAM

## 2025-06-23 PROCEDURE — 99999 PR PBB SHADOW E&M-EST. PATIENT-LVL IV: CPT | Mod: PBBFAC,,, | Performed by: STUDENT IN AN ORGANIZED HEALTH CARE EDUCATION/TRAINING PROGRAM

## 2025-06-23 PROCEDURE — 3078F DIAST BP <80 MM HG: CPT | Mod: CPTII,S$GLB,, | Performed by: STUDENT IN AN ORGANIZED HEALTH CARE EDUCATION/TRAINING PROGRAM

## 2025-06-23 PROCEDURE — 3044F HG A1C LEVEL LT 7.0%: CPT | Mod: CPTII,S$GLB,, | Performed by: STUDENT IN AN ORGANIZED HEALTH CARE EDUCATION/TRAINING PROGRAM

## 2025-06-23 PROCEDURE — 3008F BODY MASS INDEX DOCD: CPT | Mod: CPTII,S$GLB,, | Performed by: STUDENT IN AN ORGANIZED HEALTH CARE EDUCATION/TRAINING PROGRAM

## 2025-06-23 PROCEDURE — 1160F RVW MEDS BY RX/DR IN RCRD: CPT | Mod: CPTII,S$GLB,, | Performed by: STUDENT IN AN ORGANIZED HEALTH CARE EDUCATION/TRAINING PROGRAM

## 2025-06-23 PROCEDURE — 99214 OFFICE O/P EST MOD 30 MIN: CPT | Mod: S$GLB,,, | Performed by: STUDENT IN AN ORGANIZED HEALTH CARE EDUCATION/TRAINING PROGRAM

## 2025-06-23 RX ORDER — RIZATRIPTAN BENZOATE 10 MG/1
10 TABLET, ORALLY DISINTEGRATING ORAL
Qty: 20 TABLET | Refills: 0 | Status: SHIPPED | OUTPATIENT
Start: 2025-06-23

## 2025-06-23 RX ORDER — GABAPENTIN 300 MG/1
300 CAPSULE ORAL 3 TIMES DAILY
Qty: 90 CAPSULE | Refills: 5 | Status: SHIPPED | OUTPATIENT
Start: 2025-06-23 | End: 2025-12-20

## 2025-06-23 NOTE — PROGRESS NOTES
SUBJECTIVE     Chief Complaint   Patient presents with    Hand Pain       HPI  Dior Arthur is a 39 y.o. female with NNAMDI, Depression that presents for evaluation of hand pain.     History of Present Illness    CHIEF COMPLAINT:  Patient presents today for recurrence of trigger finger symptoms.    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 Symptoms have been exacerbated by increased typing at work due to additional job responsibilities. She denies acute trauma or new injury to the affected finger. She previously received intermittent gabapentin treatment from Dr. Giovany Fuentes at [facility] [address]. Current symptoms are intermittent but causing discomfort.    MIGRAINE:  She reports increased frequency of migraines, occurring approximately every two weeks over the past six weeks, compared to previous sporadic occurrences with months between episodes. Most recent episode was severe, requiring complete darkness and isolation. She missed work during a migraine episode two weeks ago. She denies experiencing aura symptoms. She recently used her sister's Rizatriptan for symptom management during a weekend migraine incident.         PAST MEDICAL HISTORY:  Past Medical History:   Diagnosis Date    Opioid dependence        PAST SURGICAL HISTORY:  Past Surgical History:   Procedure Laterality Date    AMPUTATION  01/2020    RIGHT TIP OF INDEX FINGER    BREAST SURGERY         FAMILY HISTORY:  No family history on file.    ALLERGIES AND MEDICATIONS: updated and reviewed.  Review of patient's allergies indicates:  No Known Allergies  Current Medications[1]    ROS  Review of Systems   Constitutional: Negative.  Negative for chills and fever.   HENT: Negative.  Negative for congestion and sore throat.    Respiratory:  Negative for chest tightness and shortness of breath.    Cardiovascular:  Negative for chest pain and palpitations.  "  Gastrointestinal:  Negative for abdominal pain, constipation and diarrhea.   Genitourinary:  Negative for dysuria and frequency.   Musculoskeletal:  Negative for back pain, joint swelling and neck pain.   Skin: Negative.    Neurological:  Positive for headaches. Negative for dizziness and syncope.   Psychiatric/Behavioral: Negative.           OBJECTIVE     Physical Exam  Vitals:    06/23/25 0909   BP: 120/62   Pulse: 70    Body mass index is 32.18 kg/m².  Weight: 82.4 kg (181 lb 10.5 oz)   Height: 5' 3" (160 cm)     Physical Exam  Vitals reviewed.   Constitutional:       General: She is not in acute distress.     Appearance: Normal appearance.   HENT:      Head: Normocephalic and atraumatic.      Mouth/Throat:      Mouth: Mucous membranes are moist.      Pharynx: Oropharynx is clear.   Eyes:      Extraocular Movements: Extraocular movements intact.      Conjunctiva/sclera: Conjunctivae normal.      Pupils: Pupils are equal, round, and reactive to light.   Cardiovascular:      Rate and Rhythm: Normal rate and regular rhythm.      Pulses: Normal pulses.      Heart sounds: Normal heart sounds.   Pulmonary:      Effort: Pulmonary effort is normal.      Breath sounds: Normal breath sounds.   Abdominal:      General: There is no distension.   Musculoskeletal:         General: Normal range of motion.        Hands:       Cervical back: Normal range of motion and neck supple.   Skin:     General: Skin is warm and dry.   Neurological:      General: No focal deficit present.      Mental Status: She is alert.      Cranial Nerves: Cranial nerves 2-12 are intact.      Sensory: Sensation is intact.      Motor: Motor function is intact.      Coordination: Coordination is intact.      Gait: Gait is intact.           ASSESSMENT     39 y.o. female with     1. Phantom pain    2. Migraine without aura and without status migrainosus, not intractable    3. Opioid use disorder, moderate, in sustained remission        PLAN:     1. Phantom " pain  - Chronic condition with recent exacerbation.   - At right index finger. Restart gabapentin.   - gabapentin (NEURONTIN) 300 MG capsule; Take 1 capsule (300 mg total) by mouth 3 (three) times daily.  Dispense: 90 capsule; Refill: 5    2. Migraine without aura and without status migrainosus, not intractable  - Prior history with recent progression.   - Start Rizatriptan for abortive therapy.   - Restarting gabapentin for finger pain; will help as prophylactic therapy for migraines.   - rizatriptan (MAXALT-MLT) 10 MG disintegrating tablet; Take 1 tablet (10 mg total) by mouth as needed for Migraine. May repeat in 2 hours if needed  Dispense: 20 tablet; Refill: 0    3. Opioid use disorder, moderate, in sustained remission  - Noted.       RTC PRN       Ugo Reyes MD  Family Medicine  [facility]  2025    This document was created using voice recognition software ([employer] Fluency Direct). Although it may be edited, this document may contain errors related to incorrect recognition of the spoken word. Please call the physician if clarification is needed.       No follow-ups on file.                       [1]   Current Outpatient Medications   Medication Sig Dispense Refill    sertraline (ZOLOFT) 100 MG tablet Take 1 tablet (100 mg total) by mouth once daily. 30 tablet 11    medroxyPROGESTERone (DEPO-PROVERA) 150 mg/mL Syrg SMARTSI Milliliter(s) IM Every 12 Weeks       No current facility-administered medications for this visit.

## 2025-08-12 ENCOUNTER — PATIENT MESSAGE (OUTPATIENT)
Dept: INTERNAL MEDICINE | Facility: CLINIC | Age: 40
End: 2025-08-12
Payer: COMMERCIAL

## 2025-08-12 DIAGNOSIS — G43.E09 CHRONIC MIGRAINE WITH AURA WITHOUT STATUS MIGRAINOSUS, NOT INTRACTABLE: Primary | ICD-10-CM

## 2025-08-12 RX ORDER — TOPIRAMATE 25 MG/1
TABLET, FILM COATED ORAL
Qty: 252 TABLET | Refills: 0 | Status: SHIPPED | OUTPATIENT
Start: 2025-08-12 | End: 2025-08-13 | Stop reason: SDUPTHER

## 2025-08-13 DIAGNOSIS — Z12.31 OTHER SCREENING MAMMOGRAM: ICD-10-CM

## 2025-08-13 RX ORDER — TOPIRAMATE 25 MG/1
TABLET, FILM COATED ORAL
Qty: 252 TABLET | Refills: 0 | Status: SHIPPED | OUTPATIENT
Start: 2025-08-13

## 2025-08-18 ENCOUNTER — PATIENT MESSAGE (OUTPATIENT)
Dept: ADMINISTRATIVE | Facility: HOSPITAL | Age: 40
End: 2025-08-18
Payer: COMMERCIAL

## 2025-08-21 ENCOUNTER — PATIENT OUTREACH (OUTPATIENT)
Dept: ADMINISTRATIVE | Facility: HOSPITAL | Age: 40
End: 2025-08-21
Payer: COMMERCIAL